# Patient Record
Sex: MALE | Race: WHITE
[De-identification: names, ages, dates, MRNs, and addresses within clinical notes are randomized per-mention and may not be internally consistent; named-entity substitution may affect disease eponyms.]

---

## 2017-08-25 ENCOUNTER — HOSPITAL ENCOUNTER (OUTPATIENT)
Dept: HOSPITAL 35 - CAT | Age: 47
End: 2017-08-25
Attending: NURSE PRACTITIONER
Payer: COMMERCIAL

## 2017-08-25 DIAGNOSIS — R10.11: Primary | ICD-10-CM

## 2018-10-23 ENCOUNTER — HOSPITAL ENCOUNTER (EMERGENCY)
Dept: HOSPITAL 35 - ER | Age: 48
Discharge: HOME | End: 2018-10-23
Payer: COMMERCIAL

## 2018-10-23 VITALS — BODY MASS INDEX: 24.34 KG/M2 | HEIGHT: 70 IN | WEIGHT: 170 LBS

## 2018-10-23 VITALS — DIASTOLIC BLOOD PRESSURE: 63 MMHG | SYSTOLIC BLOOD PRESSURE: 132 MMHG

## 2018-10-23 DIAGNOSIS — R10.13: Primary | ICD-10-CM

## 2018-10-23 DIAGNOSIS — G47.30: ICD-10-CM

## 2018-10-23 DIAGNOSIS — G35: ICD-10-CM

## 2018-10-23 LAB
ALBUMIN SERPL-MCNC: 3.9 G/DL (ref 3.4–5)
ALT SERPL-CCNC: 39 U/L (ref 30–65)
ANION GAP SERPL CALC-SCNC: 10 MMOL/L (ref 7–16)
AST SERPL-CCNC: 25 U/L (ref 15–37)
BASOPHILS NFR BLD AUTO: 1 % (ref 0–2)
BILIRUB DIRECT SERPL-MCNC: 0.2 MG/DL
BILIRUB SERPL-MCNC: 0.7 MG/DL
BILIRUB UR-MCNC: NEGATIVE MG/DL
BUN SERPL-MCNC: 14 MG/DL (ref 7–18)
CALCIUM SERPL-MCNC: 9.2 MG/DL (ref 8.5–10.1)
CHLORIDE SERPL-SCNC: 102 MMOL/L (ref 98–107)
CO2 SERPL-SCNC: 27 MMOL/L (ref 21–32)
COLOR UR: YELLOW
CREAT SERPL-MCNC: 1.1 MG/DL (ref 0.7–1.3)
EOSINOPHIL NFR BLD: 2.5 % (ref 0–3)
ERYTHROCYTE [DISTWIDTH] IN BLOOD BY AUTOMATED COUNT: 13.6 % (ref 10.5–14.5)
GLUCOSE SERPL-MCNC: 100 MG/DL (ref 74–106)
GRANULOCYTES NFR BLD MANUAL: 67.6 % (ref 36–66)
HCT VFR BLD CALC: 44.1 % (ref 42–52)
HGB BLD-MCNC: 15.5 GM/DL (ref 14–18)
KETONES UR STRIP-MCNC: NEGATIVE MG/DL
LIPASE: 218 U/L (ref 73–393)
LYMPHOCYTES NFR BLD AUTO: 18.1 % (ref 24–44)
MCH RBC QN AUTO: 32.9 PG (ref 26–34)
MCHC RBC AUTO-ENTMCNC: 35.2 G/DL (ref 28–37)
MCV RBC: 93.6 FL (ref 80–100)
MONOCYTES NFR BLD: 10.8 % (ref 1–8)
NEUTROPHILS # BLD: 6.8 THOU/UL (ref 1.4–8.2)
PLATELET # BLD: 228 THOU/UL (ref 150–400)
POTASSIUM SERPL-SCNC: 4 MMOL/L (ref 3.5–5.1)
PROT SERPL-MCNC: 7.8 G/DL (ref 6.4–8.2)
RBC # BLD AUTO: 4.71 MIL/UL (ref 4.5–6)
RBC # UR STRIP: NEGATIVE /UL
SODIUM SERPL-SCNC: 139 MMOL/L (ref 136–145)
SP GR UR STRIP: 1.01 (ref 1–1.03)
URINE CLARITY: CLEAR
URINE GLUCOSE-RANDOM*: NEGATIVE
URINE LEUKOCYTES-REFLEX: NEGATIVE
URINE NITRITE-REFLEX: NEGATIVE
URINE PROTEIN (DIPSTICK): NEGATIVE
UROBILINOGEN UR STRIP-ACNC: 0.2 E.U./DL (ref 0.2–1)
WBC # BLD AUTO: 10 THOU/UL (ref 4–11)

## 2019-12-23 ENCOUNTER — HOSPITAL ENCOUNTER (OUTPATIENT)
Dept: HOSPITAL 35 - TBA | Age: 49
Discharge: HOME | End: 2019-12-23
Attending: SURGERY
Payer: COMMERCIAL

## 2019-12-23 VITALS — SYSTOLIC BLOOD PRESSURE: 141 MMHG | DIASTOLIC BLOOD PRESSURE: 85 MMHG

## 2019-12-23 VITALS — WEIGHT: 172 LBS | BODY MASS INDEX: 24.62 KG/M2 | HEIGHT: 70 IN

## 2019-12-23 DIAGNOSIS — F32.9: ICD-10-CM

## 2019-12-23 DIAGNOSIS — F17.210: ICD-10-CM

## 2019-12-23 DIAGNOSIS — Z90.49: ICD-10-CM

## 2019-12-23 DIAGNOSIS — Z79.899: ICD-10-CM

## 2019-12-23 DIAGNOSIS — G35: ICD-10-CM

## 2019-12-23 DIAGNOSIS — K80.00: Primary | ICD-10-CM

## 2019-12-23 DIAGNOSIS — Z98.890: ICD-10-CM

## 2019-12-23 DIAGNOSIS — R59.0: ICD-10-CM

## 2019-12-23 DIAGNOSIS — K21.9: ICD-10-CM

## 2019-12-23 DIAGNOSIS — G47.30: ICD-10-CM

## 2019-12-23 LAB
ALBUMIN SERPL-MCNC: 3.7 G/DL (ref 3.4–5)
ALBUMIN SERPL-MCNC: 3.7 G/DL (ref 3.4–5)
ALT SERPL-CCNC: 109 U/L (ref 30–65)
ALT SERPL-CCNC: 13 U/L (ref 30–65)
ANION GAP SERPL CALC-SCNC: 12 MMOL/L (ref 7–16)
ANION GAP SERPL CALC-SCNC: 7 MMOL/L (ref 7–16)
AST SERPL-CCNC: 7 U/L (ref 15–37)
AST SERPL-CCNC: 89 U/L (ref 15–37)
BILIRUB SERPL-MCNC: 0.3 MG/DL
BILIRUB SERPL-MCNC: 0.7 MG/DL
BUN SERPL-MCNC: 11 MG/DL (ref 7–18)
BUN SERPL-MCNC: 46 MG/DL (ref 7–18)
CALCIUM SERPL-MCNC: 8.4 MG/DL (ref 8.5–10.1)
CALCIUM SERPL-MCNC: 8.5 MG/DL (ref 8.5–10.1)
CHLORIDE SERPL-SCNC: 102 MMOL/L (ref 98–107)
CHLORIDE SERPL-SCNC: 99 MMOL/L (ref 98–107)
CO2 SERPL-SCNC: 27 MMOL/L (ref 21–32)
CO2 SERPL-SCNC: 27 MMOL/L (ref 21–32)
CREAT SERPL-MCNC: 1.2 MG/DL (ref 0.7–1.3)
CREAT SERPL-MCNC: 10.9 MG/DL (ref 0.7–1.3)
GLUCOSE SERPL-MCNC: 153 MG/DL (ref 74–106)
GLUCOSE SERPL-MCNC: 184 MG/DL (ref 74–106)
POTASSIUM SERPL-SCNC: 4.7 MMOL/L (ref 3.5–5.1)
POTASSIUM SERPL-SCNC: 4.7 MMOL/L (ref 3.5–5.1)
PROT SERPL-MCNC: 6.8 G/DL (ref 6.4–8.2)
PROT SERPL-MCNC: 8.4 G/DL (ref 6.4–8.2)
SODIUM SERPL-SCNC: 136 MMOL/L (ref 136–145)
SODIUM SERPL-SCNC: 138 MMOL/L (ref 136–145)

## 2019-12-23 PROCEDURE — 54118: CPT

## 2019-12-23 PROCEDURE — 50411: CPT

## 2019-12-23 PROCEDURE — 53307: CPT

## 2019-12-23 PROCEDURE — 53312: CPT

## 2019-12-23 PROCEDURE — 52266: CPT

## 2019-12-23 PROCEDURE — 54022: CPT

## 2019-12-23 PROCEDURE — 55245: CPT

## 2019-12-23 PROCEDURE — 51297: CPT

## 2019-12-23 PROCEDURE — 70005: CPT

## 2019-12-23 PROCEDURE — 50555 KIDNEY ENDOSCOPY & BIOPSY: CPT

## 2019-12-23 PROCEDURE — 50558: CPT

## 2019-12-23 PROCEDURE — 50101: CPT

## 2019-12-23 PROCEDURE — 62110: CPT

## 2019-12-23 PROCEDURE — 52265 CYSTOSCOPY AND TREATMENT: CPT

## 2019-12-23 PROCEDURE — 50010 RENAL EXPLORATION: CPT

## 2019-12-23 PROCEDURE — 53310: CPT

## 2019-12-23 PROCEDURE — 56526: CPT

## 2019-12-23 PROCEDURE — 62900: CPT

## 2019-12-23 PROCEDURE — 56525: CPT

## 2019-12-23 PROCEDURE — 50249: CPT

## 2019-12-23 PROCEDURE — 51489: CPT

## 2019-12-24 NOTE — PATH
[facility]
[address]
[place]   [address]                     PATHOLOGY RPT PROCEDURE       
_______________________________________________________________________________
 
Name:       ARTEM ARANGO               Room #:                     [place] [facility] [place] 
M.R.#:      [id number]     Account #:      [id number]  
Admission:  12/23/19    Date of Birth:  [birth year]  
Discharge:  12/23/19                                    Report #:    [id number]
                                                        Path Case #: [id number]
_______________________________________________________________________________
 
[facility] Accession Number: [id number]
.                                                                01
Material submitted:                                        .
gallbladder - GALLBLADDER
.                                                                01
Clinical history:                                          .
Calculus of gallbladder without cholecystitis without obstruction
.                                                                02
**********************************************************************
Diagnosis:
Gallbladder, cholecystectomy:
- Marked acute hemorrhagic and ulcerated ulcerative cholecystitis.
- Cholelithiasis.
- Incidental reactive lymph node.
(IUV/db/pit; 12/23/2019)
LBQ  12/24/2019  1312 Local
**********************************************************************
.                                                                02
Electronically signed:                                     .
Kenyetta Rogers MD, Pathologist
NPI- [id number]
.                                                                01
Gross description:                                         .
The specimen is received in formalin labeled "Artem Arango,
gallbladder" and consists of a previously opened, pink-tan to purple
hemorrhagic gallbladder measuring 9.5 x 2.0 x 2.0 cm.  The margin is inked
black.  Present within the lumen and container are multiple fragmented
yellow-tan calculi measuring up to 0.5 cm.  The mucosa is pink-tan and
smooth to irregular and hemorrhagic with an average wall thickness of 0.3
cm.  Present at the fundus are a few raised aspects measuring 0.3 cm.
Adjacent the gallbladder neck is a lymph node measuring 0.6 cm.
Representative sections are submitted in A1-A3.
(SDY; 12/23/2019)
SYU/SYU  12/23/2019  1612 Local
.                                                                02
Pathologist provided ICD-10:
K80.00
.                                                                02
CPT                                                        .
683418
Specimen Comment: A courtesy copy of this report has been sent to [phone]
[phone]
Specimen Comment: [phone]
Specimen Comment: Report sent to  / DR SANDERS
***Performed at:  01
   [address], [place]  [id number]
 
 
[place] [address]                     PATHOLOGY RPT PROCEDURE       
_______________________________________________________________________________
 
Name:       ARTEM ARANGO               Room #:                     DEP NAOMI MEANS#:      [id number]     Account #:      [id number]  
Admission:  12/23/19    Date of Birth:  [birth year]  
Discharge:  12/23/19                                    Report #:    [id number]
                                                        Path Case #: [id number]
_______________________________________________________________________________
   MD Sumanth Aguilar MD Phone:  [phone]
***Performed at:  02
   [address]  [id number]
   MD Kenyetta Rogers MD Phone:  [phone]